# Patient Record
Sex: MALE | Race: WHITE | NOT HISPANIC OR LATINO | Employment: FULL TIME | URBAN - METROPOLITAN AREA
[De-identification: names, ages, dates, MRNs, and addresses within clinical notes are randomized per-mention and may not be internally consistent; named-entity substitution may affect disease eponyms.]

---

## 2024-08-28 ENCOUNTER — APPOINTMENT (OUTPATIENT)
Dept: RADIOLOGY | Facility: HOSPITAL | Age: 64
End: 2024-08-28
Payer: COMMERCIAL

## 2024-08-28 ENCOUNTER — HOSPITAL ENCOUNTER (EMERGENCY)
Facility: HOSPITAL | Age: 64
Discharge: HOME | End: 2024-08-28
Attending: STUDENT IN AN ORGANIZED HEALTH CARE EDUCATION/TRAINING PROGRAM
Payer: COMMERCIAL

## 2024-08-28 VITALS
BODY MASS INDEX: 32.78 KG/M2 | HEART RATE: 72 BPM | TEMPERATURE: 97.2 F | SYSTOLIC BLOOD PRESSURE: 118 MMHG | HEIGHT: 72 IN | RESPIRATION RATE: 16 BRPM | OXYGEN SATURATION: 97 % | DIASTOLIC BLOOD PRESSURE: 67 MMHG | WEIGHT: 242 LBS

## 2024-08-28 DIAGNOSIS — N30.00 ACUTE CYSTITIS WITHOUT HEMATURIA: ICD-10-CM

## 2024-08-28 DIAGNOSIS — R30.0 DYSURIA: Primary | ICD-10-CM

## 2024-08-28 LAB
ANION GAP SERPL CALC-SCNC: 13 MMOL/L (ref 10–20)
APPEARANCE UR: ABNORMAL
BACTERIA #/AREA URNS AUTO: ABNORMAL /HPF
BILIRUB UR STRIP.AUTO-MCNC: NEGATIVE MG/DL
BUN SERPL-MCNC: 15 MG/DL (ref 6–23)
CALCIUM SERPL-MCNC: 9.1 MG/DL (ref 8.6–10.3)
CHLORIDE SERPL-SCNC: 104 MMOL/L (ref 98–107)
CO2 SERPL-SCNC: 22 MMOL/L (ref 21–32)
COLOR UR: ABNORMAL
CREAT SERPL-MCNC: 1.16 MG/DL (ref 0.5–1.3)
EGFRCR SERPLBLD CKD-EPI 2021: 71 ML/MIN/1.73M*2
ERYTHROCYTE [DISTWIDTH] IN BLOOD BY AUTOMATED COUNT: 13.2 % (ref 11.5–14.5)
GLUCOSE SERPL-MCNC: 160 MG/DL (ref 74–99)
GLUCOSE UR STRIP.AUTO-MCNC: NORMAL MG/DL
HCT VFR BLD AUTO: 44.7 % (ref 41–52)
HGB BLD-MCNC: 14.9 G/DL (ref 13.5–17.5)
HYALINE CASTS #/AREA URNS AUTO: ABNORMAL /LPF
KETONES UR STRIP.AUTO-MCNC: ABNORMAL MG/DL
LEUKOCYTE ESTERASE UR QL STRIP.AUTO: ABNORMAL
MCH RBC QN AUTO: 29.9 PG (ref 26–34)
MCHC RBC AUTO-ENTMCNC: 33.3 G/DL (ref 32–36)
MCV RBC AUTO: 90 FL (ref 80–100)
MUCOUS THREADS #/AREA URNS AUTO: ABNORMAL /LPF
NITRITE UR QL STRIP.AUTO: ABNORMAL
NRBC BLD-RTO: 0 /100 WBCS (ref 0–0)
PH UR STRIP.AUTO: 5.5 [PH]
PLATELET # BLD AUTO: 207 X10*3/UL (ref 150–450)
POTASSIUM SERPL-SCNC: 3.7 MMOL/L (ref 3.5–5.3)
PROT UR STRIP.AUTO-MCNC: ABNORMAL MG/DL
RBC # BLD AUTO: 4.98 X10*6/UL (ref 4.5–5.9)
RBC # UR STRIP.AUTO: ABNORMAL /UL
RBC #/AREA URNS AUTO: ABNORMAL /HPF
SODIUM SERPL-SCNC: 135 MMOL/L (ref 136–145)
SP GR UR STRIP.AUTO: 1.02
UROBILINOGEN UR STRIP.AUTO-MCNC: NORMAL MG/DL
WBC # BLD AUTO: 16.6 X10*3/UL (ref 4.4–11.3)
WBC #/AREA URNS AUTO: >50 /HPF

## 2024-08-28 PROCEDURE — 99284 EMERGENCY DEPT VISIT MOD MDM: CPT

## 2024-08-28 PROCEDURE — 85027 COMPLETE CBC AUTOMATED: CPT | Performed by: STUDENT IN AN ORGANIZED HEALTH CARE EDUCATION/TRAINING PROGRAM

## 2024-08-28 PROCEDURE — 87186 SC STD MICRODIL/AGAR DIL: CPT | Mod: STJLAB | Performed by: STUDENT IN AN ORGANIZED HEALTH CARE EDUCATION/TRAINING PROGRAM

## 2024-08-28 PROCEDURE — 51798 US URINE CAPACITY MEASURE: CPT

## 2024-08-28 PROCEDURE — 81003 URINALYSIS AUTO W/O SCOPE: CPT | Performed by: STUDENT IN AN ORGANIZED HEALTH CARE EDUCATION/TRAINING PROGRAM

## 2024-08-28 PROCEDURE — 36415 COLL VENOUS BLD VENIPUNCTURE: CPT | Performed by: STUDENT IN AN ORGANIZED HEALTH CARE EDUCATION/TRAINING PROGRAM

## 2024-08-28 PROCEDURE — 80048 BASIC METABOLIC PNL TOTAL CA: CPT | Performed by: STUDENT IN AN ORGANIZED HEALTH CARE EDUCATION/TRAINING PROGRAM

## 2024-08-28 PROCEDURE — 2550000001 HC RX 255 CONTRASTS: Performed by: STUDENT IN AN ORGANIZED HEALTH CARE EDUCATION/TRAINING PROGRAM

## 2024-08-28 PROCEDURE — 74177 CT ABD & PELVIS W/CONTRAST: CPT

## 2024-08-28 PROCEDURE — 74177 CT ABD & PELVIS W/CONTRAST: CPT | Performed by: RADIOLOGY

## 2024-08-28 RX ORDER — CEPHALEXIN 500 MG/1
500 CAPSULE ORAL 4 TIMES DAILY
Qty: 28 CAPSULE | Refills: 0 | Status: SHIPPED | OUTPATIENT
Start: 2024-08-28 | End: 2024-09-04

## 2024-08-28 ASSESSMENT — PAIN DESCRIPTION - PROGRESSION: CLINICAL_PROGRESSION: NOT CHANGED

## 2024-08-28 ASSESSMENT — COLUMBIA-SUICIDE SEVERITY RATING SCALE - C-SSRS
1. IN THE PAST MONTH, HAVE YOU WISHED YOU WERE DEAD OR WISHED YOU COULD GO TO SLEEP AND NOT WAKE UP?: NO
6. HAVE YOU EVER DONE ANYTHING, STARTED TO DO ANYTHING, OR PREPARED TO DO ANYTHING TO END YOUR LIFE?: NO
2. HAVE YOU ACTUALLY HAD ANY THOUGHTS OF KILLING YOURSELF?: NO

## 2024-08-28 ASSESSMENT — LIFESTYLE VARIABLES
HAVE YOU EVER FELT YOU SHOULD CUT DOWN ON YOUR DRINKING: NO
TOTAL SCORE: 0
EVER FELT BAD OR GUILTY ABOUT YOUR DRINKING: NO
EVER HAD A DRINK FIRST THING IN THE MORNING TO STEADY YOUR NERVES TO GET RID OF A HANGOVER: NO
HAVE PEOPLE ANNOYED YOU BY CRITICIZING YOUR DRINKING: NO

## 2024-08-28 ASSESSMENT — PAIN DESCRIPTION - FREQUENCY: FREQUENCY: RARELY

## 2024-08-28 ASSESSMENT — PAIN SCALES - GENERAL: PAINLEVEL_OUTOF10: 2

## 2024-08-28 ASSESSMENT — PAIN - FUNCTIONAL ASSESSMENT: PAIN_FUNCTIONAL_ASSESSMENT: 0-10

## 2024-08-28 ASSESSMENT — PAIN DESCRIPTION - ONSET: ONSET: GRADUAL

## 2024-08-28 NOTE — ED PROVIDER NOTES
Pt Name: Michael A Zenker  MRN: [id number]  Birthdate 1960  Date of evaluation: 8/28/2024    Chief Complaint   Patient presents with    Difficulty Urinating     Patient states back pain on Sunday and now unable to urinate      HPI     63-year-old male with a past family history of prostate disease but no prior issues, no history of renal dysfunction presenting for multiple days of dysuria that progressed to difficulty urinating this morning, feeling like he cannot completely empty his bladder.  He reports he felt the urge to pee and tried to pee this AM for about 6 seconds but very little came out.  Reports some left lumbar back discomfort for a few days.  Denies weakness, numbness, saddle anesthesia.  No sudden pop or mechanism of trauma.          Patient History   No past medical history on file.  No past surgical history on file.  No family history on file.  Social History     Tobacco Use    Smoking status: Not on file    Smokeless tobacco: Not on file   Substance Use Topics    Alcohol use: Not on file    Drug use: Not on file       Physical Exam   ED Triage Vitals [08/28/24 0832]   Temperature Heart Rate Respirations BP   36.2 °C (97.2 °F) (!) 116 18 115/66      Pulse Ox Temp Source Heart Rate Source Patient Position   97 % Temporal Monitor Sitting      BP Location FiO2 (%)     Right arm --         Physical Exam  [GENERAL]: Comfortable, in no acute distress, not toxic appearing  [NEURO]: Awake, alert, moves all extremities spontaneously and without difficulty  [EYES]: EOMI and nonpainful, pupils normal size and symmetric  [ENMT]: Moist mucous membranes.  [CV]: Well-perfused extremities, no peripheral edema, no asymmetrical extremity edema  [RESP]: Unlabored respiratory effort, no distress or tachypnea, no retractions  [ABD]: Soft, nondistended, no guarding, nontender to deep palpation in all quadrants, no CVA tenderness  [MSK]: Extremities without deformity, no cyanosis, no midline spinal tenderness.  5 out  of 5 strength in both legs, sensation intact and symmetric    RESULTS:  LABS:  Labs Reviewed   URINALYSIS WITH REFLEX CULTURE AND MICROSCOPIC - Abnormal       Result Value    Color, Urine Light-Orange (*)     Appearance, Urine Turbid (*)     Specific Gravity, Urine 1.023      pH, Urine 5.5      Protein, Urine 30 (1+) (*)     Glucose, Urine Normal      Blood, Urine 0.2 (2+) (*)     Ketones, Urine TRACE (*)     Bilirubin, Urine NEGATIVE      Urobilinogen, Urine Normal      Nitrite, Urine 2+ (*)     Leukocyte Esterase, Urine 250 Lamine/µL (*)    CBC - Abnormal    WBC 16.6 (*)     nRBC 0.0      RBC 4.98      Hemoglobin 14.9      Hematocrit 44.7      MCV 90      MCH 29.9      MCHC 33.3      RDW 13.2      Platelets 207     BASIC METABOLIC PANEL - Abnormal    Glucose 160 (*)     Sodium 135 (*)     Potassium 3.7      Chloride 104      Bicarbonate 22      Anion Gap 13      Urea Nitrogen 15      Creatinine 1.16      eGFR 71      Calcium 9.1     MICROSCOPIC ONLY, URINE - Abnormal    WBC, Urine >50 (*)     RBC, Urine 11-20 (*)     Bacteria, Urine 1+ (*)     Mucus, Urine 4+      Hyaline Casts, Urine OCCASIONAL (*)    URINE CULTURE   URINALYSIS WITH REFLEX CULTURE AND MICROSCOPIC    Narrative:     The following orders were created for panel order Urinalysis with Reflex Culture and Microscopic.  Procedure                               Abnormality         Status                     ---------                               -----------         ------                     Urinalysis with Reflex C...[[id number]]  Abnormal            Final result               Extra Urine Gray Tube[[id number]]                                                         Please view results for these tests on the individual orders.   EXTRA URINE GRAY TUBE     All other labs were within normal range or not returned as of this dictation.  Imaging  CT abdomen pelvis w IV contrast   Final Result   Exam positive for acute uncomplicated cystitis        Possible mild  inflammatory changes symmetrically about the seminal   vesicles as well (question of bilateral seminal vesiculitis), not the   most compelling of CT findings        No other acute findings        No stone or acute blood clot in the urinary bladder        No gas in the urinary bladder wall (no emphysematous cystitis)        No fistula involving the urinary bladder (or anything else)        No urachal remnant spectrum anomaly        No compelling CT evidence for acute pyelonephritis, which no imaging   study of any variety can definitively exclude        No right or left hydroureteronephrosis        No in either kidney or either ureter (no stone anywhere in the entire   urinary tract upper or lower)        Definitely no renal (or any other abdominal/pelvic) abscess        No separate acute process outside the urinary bladder        Normal appendix        No acute diverticulitis or other colitis, bowel obstruction,   perforation, abscess or free fluid        No acute skeletal findings to account for any new or different back   pain        MACRO:   None        Signed by: Ede Mcnair 8/28/2024 11:06 AM   Dictation workstation:   [id number]           Procedures  Procedures    Medical Decision Making  Bladder scan 23 mL on arrival, low concern for any retention.  Suspect UTI but with patient's left lower back discomfort feel scanning for ureteral stone with underlying infection is reasonable.  During patient's stay he has been ambulatory with no discomfort.  His back pain resolved.  He urinated during his emergency department stay with full relief of his symptoms, no retention or feeling like he needs to urinate more.    He has a very reassuring workup.  We discussed starting on antibiotics, following up with his PCP or urologist and returning for any new or concerning symptoms or failing to improve.  Since he is asymptomatic with a normal heart rate, reassuring labs and CT think outpatient follow-up is reasonable.  All  questions answered and patient very comfortable with this plan.        ED Course & MDM     ED Course as of 08/28/24 1221   Wed Aug 28, 2024   0854 Bladder scan 20s [CR]   1053 HR 76, no pain, ambulatory in room with no discomfort. [CR]      ED Course User Index  [CR] Osiel Singh MD         Diagnoses as of 08/28/24 1221   Dysuria   Acute cystitis without hematuria       ED Medications administered this visit:    Medications   iohexol (OMNIPaque) 350 mg iodine/mL solution 90 mL (75 mL intravenous Given 8/28/24 1019)       New Prescriptions from this visit:    New Prescriptions    CEPHALEXIN (KEFLEX) 500 MG CAPSULE    Take 1 capsule (500 mg) by mouth 4 times a day for 7 days.       Follow-up:  No follow-up provider specified.      Final Impression:   1. Dysuria    2. Acute cystitis without hematuria          Osiel Singh MD  [facility] Emergency [facility]      Osiel Singh MD  08/28/24 0857       Osiel Singh MD  08/28/24 1221

## 2024-08-28 NOTE — DISCHARGE INSTRUCTIONS
Please follow up with your urologist or primary care physician for further management of this issue.  Your need to be re-evaluated over the next 2 to 3 days if you are not improving, and sooner  if your symptoms worsen.   Return to the ER for new, worse, or concerning symptoms, such as fevers, chills, unable to urinate, abdominal pain    As we discussed, although I do not expect your condition to worsen, there is diagnostic and  certainty at this time in the potential for your condition to change it worsen. If you have ANY concerns or feel like your condition is changing/worsening, please RETURN TO THE ER to be reevaluated.

## 2024-08-30 LAB — BACTERIA UR CULT: ABNORMAL
